# Patient Record
Sex: FEMALE | Race: WHITE | NOT HISPANIC OR LATINO | RURAL
[De-identification: names, ages, dates, MRNs, and addresses within clinical notes are randomized per-mention and may not be internally consistent; named-entity substitution may affect disease eponyms.]

---

## 2018-07-25 ENCOUNTER — PREPPED CHART (OUTPATIENT)
Dept: RURAL CLINIC 3 | Facility: CLINIC | Age: 78
End: 2018-07-25

## 2018-07-25 NOTE — PATIENT DISCUSSION
Per h & p patient is cleared for cataract surgery OD Cataract OD -Visually significant cataract OD. -Cataract(s) causing symptomatic impairment of visual function not correctable with a tolerable change in glasses or contact lenses, lighting, or non-operative means resulting in specific activity limitations and/or participation restrictions including, but not limited to reading, viewing television, driving, or meeting vocational or recreational needs. -Expectation is clearer vision and functional improvement in symptoms as well as reduced glare disability after cataract removal. -Recommend Toric IOL / Limbal Relaxing Incisions, based on previous OPD testing and lifestyle questionnaire. -All questions were answered regarding surgery, including pre and post-op medications, appointments, activity restrictions and anesthetic usage. -The risks, benefits and alternatives, and special risk factors for the patient, were discussed in detail, including but not limited to: bleeding, infection, retinal detachment, vitreous loss, problems with the implant, and possible need for additional surgery. -Although rare, the possibility of complete vision loss was discussed. -The need for glasses post-operatively was discussed. ********Patient elcts MONO VISION for NEAR VISION OD -Patient elects to proceed with cataract surgery OD. Will schedule at patient's convenience. s/p PCIOL OS -Pt doing well at 1 week s/p PCIOL OS -Continue post-op gtts according to instruction sheet. -Okay to resume usual activites and d/c eye shield.

## 2022-03-01 ASSESSMENT — TONOMETRY
OD_IOP_MMHG: 12
OS_IOP_MMHG: 09

## 2022-04-05 ENCOUNTER — NEW PATIENT (OUTPATIENT)
Dept: RURAL CLINIC 3 | Facility: CLINIC | Age: 82
End: 2022-04-05

## 2022-04-05 DIAGNOSIS — H26.493: ICD-10-CM

## 2022-04-05 DIAGNOSIS — H35.3131: ICD-10-CM

## 2022-04-05 PROCEDURE — 99204 OFFICE O/P NEW MOD 45 MIN: CPT

## 2022-04-05 ASSESSMENT — VISUAL ACUITY
OS_PH: 20/100
OS_SC: 20/150
OD_SC: 20/300
OD_PH: 20/70

## 2022-04-05 ASSESSMENT — TONOMETRY
OS_IOP_MMHG: 15
OD_IOP_MMHG: 15

## 2022-04-05 NOTE — PATIENT DISCUSSION
Importance of daily AREDS II study multivitamin and Amsler Grid checks discussed with patient. Patient to follow-up immediately with any new onset of decreased vision and/or metamorphopsia, sample of Preservision given today.

## 2022-04-05 NOTE — PATIENT DISCUSSION
Discussed diagnosis in detail with patient. Moderate PCO noted OU. Recommend refer to Dr. Yudi Sterling for evaluation. Patient elects to schedule.

## 2022-04-22 ENCOUNTER — CONSULTATION/EVALUATION (OUTPATIENT)
Dept: RURAL CLINIC 3 | Facility: CLINIC | Age: 82
End: 2022-04-22

## 2022-04-22 DIAGNOSIS — H26.493: ICD-10-CM

## 2022-04-22 PROCEDURE — 66821 AFTER CATARACT LASER SURGERY: CPT

## 2022-04-22 ASSESSMENT — TONOMETRY
OS_IOP_MMHG: 15
OD_IOP_MMHG: 15

## 2022-04-22 ASSESSMENT — VISUAL ACUITY
OD_SC: 20/80
OD_BAT: 20/200
OS_BAT: 20/200
OS_SC: 20/150

## 2022-04-22 NOTE — PATIENT DISCUSSION
Discussed diagnosis in detail with patient. Moderate PCO noted OU. Did os today. Pt will come back in 1-2 wk f/u pov for os.

## 2022-04-22 NOTE — PROCEDURE NOTE: CLINICAL
PROCEDURE NOTE: YAG Capsulotomy OS. Diagnosis: Other Secondary Cataract. Anesthesia: Topical. The purpose and nature of the procedure, possible alternative methods of treatment, the risks involved and the possibility of complications were discussed with patient. The Patient wishes to proceed and the consent was signed. 1 gtt Prolensa applied. The laser was then performed under topical anesthesia with no complications. Post op instructions were given to patient as well as a follow-up appointment. Patient was advised to call our office if any questions or concerns. Brian Prado

## 2022-05-03 ENCOUNTER — POST-OP (OUTPATIENT)
Dept: RURAL CLINIC 3 | Facility: CLINIC | Age: 82
End: 2022-05-03

## 2022-05-03 DIAGNOSIS — Z98.890: ICD-10-CM

## 2022-05-03 PROCEDURE — 99024 POSTOP FOLLOW-UP VISIT: CPT

## 2022-05-03 ASSESSMENT — TONOMETRY
OS_IOP_MMHG: 13
OD_IOP_MMHG: 13

## 2022-05-03 ASSESSMENT — VISUAL ACUITY
OS_SC: 20/50-2
OS_PH: 20/40-1
OD_SC: 20/100-1

## 2022-05-06 ENCOUNTER — CONSULTATION/EVALUATION (OUTPATIENT)
Dept: RURAL CLINIC 3 | Facility: CLINIC | Age: 82
End: 2022-05-06

## 2022-05-06 DIAGNOSIS — H26.491: ICD-10-CM

## 2022-05-06 PROCEDURE — 92012 INTRM OPH EXAM EST PATIENT: CPT

## 2022-05-06 ASSESSMENT — VISUAL ACUITY
OS_SC: 20/40
OD_PH: 20/80
OD_SC: 20/90

## 2022-05-06 ASSESSMENT — TONOMETRY
OS_IOP_MMHG: 13
OD_IOP_MMHG: 13

## 2022-05-06 NOTE — PROCEDURE NOTE: CLINICAL
PROCEDURE NOTE: YAG Capsulotomy OD. Diagnosis: Other Secondary Cataract. Anesthesia: Topical. The purpose and nature of the procedure, possible alternative methods of treatment, the risks involved and the possibility of complications were discussed with patient. The Patient wishes to proceed and the consent was signed. 1 gtt Prolensa applied. The laser was then performed under topical anesthesia with no complications. Post op instructions were given to patient as well as a follow-up appointment. Patient was advised to call our office if any questions or concerns. Montrell Mcarthur

## 2022-05-17 ENCOUNTER — POST-OP (OUTPATIENT)
Dept: RURAL CLINIC 3 | Facility: CLINIC | Age: 82
End: 2022-05-17

## 2022-05-17 DIAGNOSIS — Z98.890: ICD-10-CM

## 2022-05-17 PROCEDURE — 99024 POSTOP FOLLOW-UP VISIT: CPT

## 2022-05-17 ASSESSMENT — VISUAL ACUITY
OS_SC: 20/30-2
OD_PH: 20/40-1
OD_SC: 20/50

## 2022-05-17 ASSESSMENT — TONOMETRY
OD_IOP_MMHG: 13
OS_IOP_MMHG: 13

## 2023-08-02 ENCOUNTER — FOLLOW UP (OUTPATIENT)
Dept: RURAL CLINIC 3 | Facility: CLINIC | Age: 83
End: 2023-08-02

## 2023-08-02 DIAGNOSIS — H52.223: ICD-10-CM

## 2023-08-02 DIAGNOSIS — H52.11: ICD-10-CM

## 2023-08-02 PROCEDURE — 92015 DETERMINE REFRACTIVE STATE: CPT

## 2023-08-02 PROCEDURE — 92014 COMPRE OPH EXAM EST PT 1/>: CPT

## 2023-08-02 ASSESSMENT — VISUAL ACUITY
OS_SC: 20/70
OD_SC: 20/50
OD_PH: 20/60
OD_SC: 20/150

## 2023-08-02 ASSESSMENT — TONOMETRY
OD_IOP_MMHG: 14
OS_IOP_MMHG: 14